# Patient Record
Sex: FEMALE | Race: WHITE
[De-identification: names, ages, dates, MRNs, and addresses within clinical notes are randomized per-mention and may not be internally consistent; named-entity substitution may affect disease eponyms.]

---

## 2017-12-04 ENCOUNTER — HOSPITAL ENCOUNTER (EMERGENCY)
Dept: HOSPITAL 25 - ED | Age: 42
Discharge: HOME | End: 2017-12-04
Payer: COMMERCIAL

## 2017-12-04 VITALS — SYSTOLIC BLOOD PRESSURE: 124 MMHG | DIASTOLIC BLOOD PRESSURE: 73 MMHG

## 2017-12-04 DIAGNOSIS — R42: ICD-10-CM

## 2017-12-04 DIAGNOSIS — F17.210: ICD-10-CM

## 2017-12-04 DIAGNOSIS — R07.9: Primary | ICD-10-CM

## 2017-12-04 LAB
ALBUMIN SERPL BCG-MCNC: 4.2 G/DL (ref 3.2–5.2)
ALP SERPL-CCNC: 45 U/L (ref 34–104)
ALT SERPL W P-5'-P-CCNC: 9 U/L (ref 7–52)
ANION GAP SERPL CALC-SCNC: 5 MMOL/L (ref 2–11)
AST SERPL-CCNC: 12 U/L (ref 13–39)
BUN SERPL-MCNC: 14 MG/DL (ref 6–24)
BUN/CREAT SERPL: 16.7 (ref 8–20)
CALCIUM SERPL-MCNC: 9 MG/DL (ref 8.6–10.3)
CHLORIDE SERPL-SCNC: 108 MMOL/L (ref 101–111)
CK SERPL-CCNC: 38 U/L (ref 10–223)
GLOBULIN SER CALC-MCNC: 2.8 G/DL (ref 2–4)
GLUCOSE SERPL-MCNC: 102 MG/DL (ref 70–100)
HCO3 SERPL-SCNC: 26 MMOL/L (ref 22–32)
HCT VFR BLD AUTO: 38 % (ref 35–47)
HGB BLD-MCNC: 13.5 G/DL (ref 12–16)
MCH RBC QN AUTO: 32 PG (ref 27–31)
MCHC RBC AUTO-ENTMCNC: 35 G/DL (ref 31–36)
MCV RBC AUTO: 90 FL (ref 80–97)
POTASSIUM SERPL-SCNC: 4 MMOL/L (ref 3.5–5)
PROT SERPL-MCNC: 7 G/DL (ref 6.4–8.9)
RBC # BLD AUTO: 4.25 10^6/UL (ref 4–5.4)
SODIUM SERPL-SCNC: 139 MMOL/L (ref 133–145)
T4 SERPL-MCNC: 6.36 MCG/ML (ref 6.09–12.23)
TROPONIN I SERPL-MCNC: 0 NG/ML (ref ?–0.04)
TSH SERPL-ACNC: 1.62 MCIU/ML (ref 0.34–5.6)
WBC # BLD AUTO: 5.9 10^3/UL (ref 3.5–10.8)

## 2017-12-04 PROCEDURE — 99283 EMERGENCY DEPT VISIT LOW MDM: CPT

## 2017-12-04 PROCEDURE — 36415 COLL VENOUS BLD VENIPUNCTURE: CPT

## 2017-12-04 PROCEDURE — 93005 ELECTROCARDIOGRAM TRACING: CPT

## 2017-12-04 PROCEDURE — 84443 ASSAY THYROID STIM HORMONE: CPT

## 2017-12-04 PROCEDURE — 82553 CREATINE MB FRACTION: CPT

## 2017-12-04 PROCEDURE — 85025 COMPLETE CBC W/AUTO DIFF WBC: CPT

## 2017-12-04 PROCEDURE — 84484 ASSAY OF TROPONIN QUANT: CPT

## 2017-12-04 PROCEDURE — 80053 COMPREHEN METABOLIC PANEL: CPT

## 2017-12-04 PROCEDURE — 84436 ASSAY OF TOTAL THYROXINE: CPT

## 2017-12-04 PROCEDURE — 82550 ASSAY OF CK (CPK): CPT

## 2017-12-04 PROCEDURE — 85730 THROMBOPLASTIN TIME PARTIAL: CPT

## 2017-12-04 PROCEDURE — 83880 ASSAY OF NATRIURETIC PEPTIDE: CPT

## 2017-12-04 PROCEDURE — 84702 CHORIONIC GONADOTROPIN TEST: CPT

## 2017-12-04 PROCEDURE — 71020: CPT

## 2017-12-04 PROCEDURE — 83605 ASSAY OF LACTIC ACID: CPT

## 2017-12-04 NOTE — RAD
INDICATION: Chest pain     



COMPARISON: None

 

TECHNIQUE: PA and lateral dual-energy views were obtained.



FINDINGS: 



Bones/Soft Tissues: There are no acute bony findings.    



Cardiomediastinal: The cardiomediastinal silhouette is normal. 



Lungs: There are no infiltrates.



Pleura: There are no pleural effusions. 



Other: None



IMPRESSION:  NEGATIVE EXAMINATION.

## 2017-12-05 NOTE — ED
Mireille ESPINOZA Edward, scribed for Kirit Casey MD on 12/04/17 at 1642 .





HPI Chest Pain





- HPI Summary


HPI Summary: 





40 y/o female presents to the ED c/o CP starting 5 days ago. The pain started 

at the R side of the neck that radiated down into the chest. It was described 

as a tightness. The pt had CP earlier today. Currently the pt denies CP, SOB, 

and N/V/D. The pt's BP, taken today by herself was 166/116. Pt went to her PCP. 

Associated sx: intermittent dizziness earlier today. PMHx lymphedema.





- History of Current Complaint


Chief Complaint: EDChestPainROMI


Time Seen by Provider: 12/04/17 16:41


Hx Obtained From: Patient


Hx Last Menstrual Period: 1 WEEK AGO


Onset/Duration: Started Days Ago, Still Present


Pain Intensity: 0


Chest Pain Location: Mid Sternal


Chest Pain Radiates: Yes


Chest Pain Radiates To:: Neck


Character: Tightness


Aggravating Factor(s): Nothing


Alleviating Factor(s): Nothing


Associated Signs and Symptoms: Positive: Chest Pain, Dizziness.  Negative: 

Shortness of Breath, Nausea, Vomiting, Other: - negative: diarrhea





- Allergy/Home Medications


Allergies/Adverse Reactions: 


 Allergies











Allergy/AdvReac Type Severity Reaction Status Date / Time


 


Cefadroxil [From Duricef] Allergy Severe N/V Verified 07/24/14 18:10














PMH/Surg Hx/FS Hx/Imm Hx


Previously Healthy: No


Endocrine/Hematology History: 


   Denies: Hx Diabetes, Hx Thyroid Disease


Cardiovascular History: 


   Denies: Hx Hypertension


Respiratory History: 


   Denies: Hx Asthma, Hx Chronic Obstructive Pulmonary Disease (COPD)


GI History: 


   Denies: Hx Ulcer





- Cancer History


Hx Chemotherapy: No


Hx Radiation Therapy: No


Infectious Disease History: No


Infectious Disease History: 


   Denies: Hx Clostridium Difficile, Hx Hepatitis, Hx Human Immunodeficiency 

Virus (HIV), Hx of Known/Suspected MRSA, Hx Shingles, Hx Tuberculosis, Traveled 

Outside the [place] in Last 30 Days





- Family History


Known Family History: Positive: None





- Social History


Alcohol Use: Occasionally


Hx Substance Use: No


Substance Use Type: Reports: None


Hx Tobacco Use: Yes


Smoking Status (MU): Heavy Every Day Tobacco Smoker


Type: Cigarettes


Amount Used/How Often: 1 PPD





Review of Systems


Constitutional: Negative


Eyes: Negative


ENT: Negative


Positive: Chest Pain


Respiratory: Negative


Gastrointestinal: Negative


Genitourinary: Negative


Musculoskeletal: Negative


Skin: Negative


Neurological: Other - dizziness


Psychological: Normal


All Other Systems Reviewed And Are Negative: Yes





Physical Exam





- Summary


Physical Exam Summary: 





VITAL SIGNS: Reviewed.


GENERAL: ~Patient is a well-developed and nourished female who is lying 

comfortable in the stretcher. ~Patient is not in any acute respiratory distress.


HEAD AND FACE: No signs of trauma. ~No ecchymosis, hematomas or skull 

depressions. No sinus tenderness.


EYES: PERRLA, EOMI x 2, No injected conjunctiva, no nystagmus.


EARS: Hearing grossly intact. Ear canals and tympanic membranes are within 

normal limits.


MOUTH: Oropharynx within normal limits.


NECK: Supple, trachea is midline, no adenopathy, no JVD, no carotid bruit, no c-

spine tenderness, neck with full ROM.


CHEST: Symmetric, no tenderness at palpation


LUNGS: Clear to auscultation bilaterally. No wheezing or crackles.


CVS: Regular rate and rhythm, S1 and S2 present, no murmurs or gallops 

appreciated.


ABDOMEN: Soft, non-tender. No signs of distention. No rebound no guarding, and 

no masses palpated. Bowel sounds are normal.


EXTREMITIES: FROM in all major joints, no edema, no cyanosis or clubbing.


NEURO: Alert and oriented x 3. No acute neurological deficits. Speech is normal 

and follows commands.


SKIN: Dry and warm


Triage Information Reviewed: Yes


Vital Signs On Initial Exam: 


 Initial Vitals











Temp Pulse Resp BP Pulse Ox


 


 97.1 F   80   16   156/100   99 


 


 12/04/17 16:33  12/04/17 16:33  12/04/17 16:33  12/04/17 16:33  12/04/17 16:33











Vital Signs Reviewed: Yes





Diagnostics





- Vital Signs


 Vital Signs











  Temp Pulse Resp BP Pulse Ox


 


 12/04/17 16:33  97.1 F  80  16  156/100  99














- Laboratory


Result Diagrams: 


 12/04/17 16:51





 12/04/17 16:51


Lab Statement: Any lab studies that have been ordered have been reviewed, and 

results considered in the medical decision making process.





- Radiology


  ** CXR


Xray Interpretation: No Acute Changes - NEGATIVE EXAM


Radiology Interpretation Completed By: Radiologist - ED PHYSICIAN REVIEWS AND 

AGREES





- EKG


  ** 1


EKG Interpretation: 16:59 - SR @ 68 BPM. NO ST ELEVATIONS





Chest Pain Course/Dx





- Course


Assessment/Plan: 40 y/o female presents to the ED c/o CP starting 5 days ago. 

The pain started at the R side of the neck that radiated down into the chest. 

It was described as a tightness. The pt had CP earlier today. Currently the pt 

denies CP, SOB, and N/V/D. The pt's BP, taken today by herself was 166/116. Pt 

went to her PCP. Associated sx: intermittent dizziness earlier today. PMHx 

lymphedema. EKG @ 16:59 - SR @ 68 BPM. NO ST ELEVATIONS. CXR NEGATIVE. Test 

without significant abnormalities. Trop 0.00. EKG shows no ST elevations. PT is 

asymptomatic in ED. I will wait on a second troponin; if negative the pt can be 

d/c with f/u with pcp. At this point, a 2nd trop will be done at 20:30. Pt will 

be sign out to Dr. Tobar to follow 2nd troponin.





- Diagnoses


Provider Diagnoses: 


 Chest pain








Discharge





- Discharge Plan


Condition: Stable


Disposition: OTHER


Discharge Disposition Comment: PT SIGNED OUT TO DR TOBAR PENDING 2nd TROP


Referrals: 


Maryan Whitfield MD [Primary Care Provider] - 





The documentation as recorded by the Mireille kearns Edward accurately reflects the 

service I personally performed and the decisions made by me, Kirit Casey MD.

## 2018-11-26 ENCOUNTER — HOSPITAL ENCOUNTER (EMERGENCY)
Dept: HOSPITAL 25 - UCEAST | Age: 43
Discharge: HOME | End: 2018-11-26
Payer: COMMERCIAL

## 2018-11-26 VITALS — DIASTOLIC BLOOD PRESSURE: 83 MMHG | SYSTOLIC BLOOD PRESSURE: 146 MMHG

## 2018-11-26 DIAGNOSIS — H57.12: ICD-10-CM

## 2018-11-26 DIAGNOSIS — H61.23: Primary | ICD-10-CM

## 2018-11-26 DIAGNOSIS — F17.210: ICD-10-CM

## 2018-11-26 DIAGNOSIS — H92.03: ICD-10-CM

## 2018-11-26 DIAGNOSIS — Z88.1: ICD-10-CM

## 2018-11-26 PROCEDURE — 99213 OFFICE O/P EST LOW 20 MIN: CPT

## 2018-11-26 PROCEDURE — G0463 HOSPITAL OUTPT CLINIC VISIT: HCPCS

## 2018-11-26 NOTE — XMS REPORT
Sushma Bartlett

 Created on:2018



Patient:Sushma Bartlett

Sex:Female

:1975

External Reference #:[id number]





Demographics







 Address  [facility] [place] [address]

 

 Mobile Phone  [phone]

 

 Preferred Language  English

 

 Marital Status  Not  Or 

 

 Yarsanism Affiliation  Unknown

 

 Race  White

 

 Ethnic Group  Not  Or 









Author







 Organization  [facility]

 

 Address  [address]



   [place] [address]

 

 Phone  [phone]









Support







 Name  Relationship  Address  Phone

 

 Haider Bartlett  Unavailable  Unavailable  [phone]









Care Team Providers







 Name  Role  Phone

 

 Maryan Whitfield MD  Primary Care Physician  Unavailable









Payers







 Type  Date  Identification Numbers  Payment Provider  Subscriber

 

 Commercial    Policy Number: [id number]  [facility]  Haider Bartlett









 PayID: [id number]  [address]









 [place] [address]







Problems







 Date  Description  Provider  Status

 

 Onset: 2016  Disturbance in sleep behavior  Carmella Gorman MD  Active

 

 Onset: 2016  Obesity  Carmella Gorman MD  Active

 

 Onset: 2016  Obstructive sleep apnea syndrome  Carmella Gorman MD  Active







Family History







 Date  Family Member(s)  Problem(s)  Comments

 

   General  Maternal Aunt  d/t  



     Pancreatic Ca  

 

   General  Paternal Aunt Pancreatic Ca  

 

   Father  Hypertension  

 

   Father   due to MI  ()

 

   Mother  Hyperlipidemia  

 

   Siblings  3  Brother w/HTN ; sister



       kidney stones; brother



       healthy







Social History







 Type  Date  Description  Comments

 

 Marital Status      

 

 Lives With    Family  

 

 Occupation    Currently Working  

 

 ETOH Use    Occasionally consumes alcohol  

 

 Recreational Drug Use    Denies Drug Use  

 

 Smoking    Patient is a former smoker  

 

 Smoking    Patient is a former smoker  quit May 2018

 

 Daily Caffeine    Consumes on average 1 cup of  



     regular coffee per day  

 

 Daily Caffeine    Consumes on average 1 soda per day  

 

 Exercise Type/Frequency    Exercises sporadically  

 

 General Hx [employer]







Allergies, Adverse Reactions, Alerts







 Date  Description  Reaction  Status  Severity  Comments

 

 2016  Duricef    active    Diarrhea,vomiting







Medications







 Medication  Date  Status  Form  Strength  Qnty  SIG  Indications  Ordering



                 Provider

 

 Pravastatin Sodium    Active  Tablets  40mg  30tab  take 1  E78.5          s  tablet    Thuman,



             oral daily    NP



             before    



             bedtime.    

 

 Ibuprofen    Active  Tablets  200mg    as needed    Unknown



   /2016              

 

 Tylenol Extra    Active  Tablets  500mg    as needed    Unknown



 Strength  /              

 

 Nicorette    Active  Gum  2mg    as needed    Unknown



   /              

 

                 

 

 Mandibular    Hx  Device      dear ,  G47.33  Carmella



 Advancement Device  /          please    Sherif,



   -          evaluate    MD



             and    



             fabricate    



             oral    



             appliance    



             for mild    



             sleep    



             apnea    

 

 Hydrochlorothiazide    Hx  Tablets  12.5mg    1 by mouth    Unknown



   /2016          every day    



   -              



                 







Vital Signs







 Date  Vital  Result  Comment

 

 2018  Weight  209.00 lb  with shoes









 Heart Rate  68 /min  

 

 BP Systolic Sitting  138 mmHg  Lue regular cuff

 

 BP Diastolic Sitting  82 mmHg  Lue regular cuff

 

 Ejection Fraction  50-55  echocardiogram 1/24/18









 10/02/2018  Height  62 inches  5'2"









 Weight  204.00 lb  

 

 Heart Rate  72 /min  

 

 BP Systolic  128 mmHg  Ra, reg

 

 BP Diastolic  94 mmHg  Ra, reg

 

 BMI (Body Mass Index)  37.3 kg/m2  

 

 Ejection Fraction  50%-55%  18  echo









 2018  Height  62 inches  5'2"









 Weight  190.75 lb  with shoes

 

 Heart Rate  72 /min  

 

 BP Systolic Sitting  128 mmHg  LA, reg cuff

 

 BP Diastolic Sitting  88 mmHg  LA, reg cuff

 

 BMI (Body Mass Index)  34.9 kg/m2  

 

 Ejection Fraction  50%-55%  echo 2018  Height  62 inches  5'2"









 Weight  191.00 lb  

 

 Heart Rate  76 /min  

 

 BP Systolic Sitting  122 mmHg  

 

 BP Diastolic Sitting  82 mmHg  

 

 Respiratory Rate  14 /min  

 

 O2 % BldC Oximetry  98 %  

 

 BMI (Body Mass Index)  34.9 kg/m2  









 2017  Height  62 inches  5'2"









 Weight  189.38 lb  with shoes

 

 Heart Rate  66 /min  

 

 BP Systolic Sitting  128 mmHg  LA reg cuff

 

 BP Diastolic Sitting  88 mmHg  LA reg cuff

 

 BMI (Body Mass Index)  34.6 kg/m2  









 2017  Height  62 inches  5'2"









 Weight  191.00 lb  

 

 Heart Rate  78 /min  

 

 BP Systolic Sitting  134 mmHg  

 

 BP Diastolic Sitting  96 mmHg  

 

 Respiratory Rate  16 /min  

 

 O2 % BldC Oximetry  98 %  room air

 

 BMI (Body Mass Index)  34.9 kg/m2  









 2017  Height  62 inches  5'2"









 Heart Rate  93 /min  

 

 BP Systolic Sitting  120 mmHg  

 

 BP Diastolic Sitting  70 mmHg  

 

 Respiratory Rate  16 /min  

 

 Pain Level  0  

 

 O2 % BldC Oximetry  98 %  









 10/27/2016  Height  62 inches  5'2"









 Weight  180.00 lb  

 

 Heart Rate  78 /min  

 

 BP Systolic Sitting  132 mmHg  

 

 BP Diastolic Sitting  70 mmHg  

 

 Respiratory Rate  16 /min  

 

 O2 % BldC Oximetry  97 %  

 

 BMI (Body Mass Index)  32.9 kg/m2  









 2016  Height  63 inches  5'3"









 Weight  183.00 lb  per pt

 

 Heart Rate  84 /min  

 

 BP Systolic Sitting  130 mmHg  

 

 BP Diastolic Sitting  66 mmHg  

 

 Respiratory Rate  16 /min  

 

 O2 % BldC Oximetry  98 %  

 

 BMI (Body Mass Index)  32.4 kg/m2  









 2016  Height  63 inches  5'3"









 Weight  190.00 lb  

 

 Heart Rate  85 /min  

 

 BP Systolic Sitting  122 mmHg  

 

 BP Diastolic Sitting  66 mmHg  

 

 Respiratory Rate  14 /min  

 

 O2 % BldC Oximetry  98 %  

 

 BMI (Body Mass Index)  33.7 kg/m2  









 2016  Height  63 inches  5'3"









 Weight  192.00 lb  

 

 Heart Rate  75 /min  

 

 BP Systolic Sitting  126 mmHg  

 

 BP Diastolic Sitting  74 mmHg  

 

 O2 % BldC Oximetry  8 %  

 

 BMI (Body Mass Index)  34.0 kg/m2  

 

 Neck Circumference in inches  14  







Results







 Test  Date  Test  Result  H/L  Range  Note

 

 Lipid Panel - JFM  2018  Creatine Kinase(CK)  40 U/L      1

 

 Comp Metabolic Panel  2018  Sodium  138 mmol/L    135-145  









 Chloride  109 mmol/L    101-111  

 

 Co2 Carbon Dioxide  22 mmol/L    22-32  

 

 Calcium  9.1 mg/dL    8.6-10.3  

 

 Albumin  4.0 g/dL    3.2-5.2  

 

 Total Bilirubin  0.50 mg/dL    0.2-1.0  

 

 Glucose  85 mg/dL      

 

 Blood Urea Nitrogen  13 mg/dL    6-24  

 

 Creatinine  0.74 mg/dL    0.51-0.95  

 

 BUN/Creatinine Ratio  17.6    8-20  

 

 Total Protein  6.7 g/dL    6.4-8.9  

 

 Globulin  2.7 g/dL    2-4  

 

 Albumin/Globulin Ratio  1.5    1-3  

 

 Alkaline Phosphatase  51 U/L      

 

 Alt  11 U/L    7-52  

 

 Egfr Non-  86.1    >60  

 

 Egfr   104.1    >60  2

 

 Potassium  TNP mmol/L    3.5-5.0  3

 

 Anion Gap  7 mmol/L    2-11  

 

 Ast  TNP U/L    13-39  4









 Lipid Profile (Trig/Chol/HDL)  2018  Triglycerides  71 mg/dL      5









 Cholesterol  237 mg/dL      6

 

 HDL Cholesterol  54.7 mg/dL      7

 

 LDL Cholesterol  168 mg/dL      8









 1  SHORT DRAW!!



   fasting



   before ov with NP

 

 2  *******Because ethnic data is not always readily available,



   this report includes an eGFR for both -Americans and



   non- Americans.****



   The National Kidney Disease Education Program (NKDEP) does



   not endorse the use of the MDRD equation for patients that



   are not between the ages of 18 and 70, are pregnant, have



   extremes of body size, muscle mass, or nutritional status,



   or are non- or non-.



   According to the National Kidney Foundation, irrespective of



   diagnosis, the stage of the disease is based on the level of



   kidney function:



   Stage Description                      GFR(mL/min/1.73 m(2))



   1     Kidney damage with normal or decreased GFR       90



   2     Kidney damage with mild decrease in GFR          60-89



   3     Moderate decrease in GFR                         30-59



   4     Severe decrease in GFR                           15-29



   5     Kidney failure                       <15 (or dialysis)

 

 3  Specimen Hemolyzed. Result may not be valid.



   Unable to report test result due to hemolysis.

 

 4  Unable to report test result due to hemolysis.

 

 5  Desirable: <150



   Borderline High: 150-199



   High: 200-499



   Very High: >500

 

 6  Desirable: <200



   Borderline High: 200-239



   High: >239

 

 7  Low: <40



   Desirable: 40-60



   High: >60

 

 8  Desirable: <100



   Near Optimal: 100-129



   Borderline High: 130-159



   High: 160-189



   Very High: >189







Procedures







 Date  CPT Code  Description  Status

 

 10/02/2018  38480  EKG Tracing & Interpretation  Completed

 

 2018  64505  ECHO Transthoracic, Real-Time 2D With Doppler And Color  
Completed



     Flow  

 

 2018  15225  ECHO Transthoracic, Real-Time 2D With Doppler And Color  
Completed



     Flow  

 

 2018  10472  ECHO Stress Test Incl Perf Contiuous ekg Monitoring  
Completed



     W/Phys Superv  

 

 2017  28833  EKG Tracing & Interpretation  Completed

 

 2016  15572  Sleep Study Unattended,HRT Rate,Oxygen Sat,Resp  Completed



     Effort/Airflow  







Encounters







 Type  Date  Location  Provider  CPT E/M  Dx

 

 Office Visit  10/02/2018  4:00p  [facility]  [place],  
[address]  E66.9



       M.D.    









 G47.33

 

 Z87.891

 

 Z82.49

 

 Z82.41

 

 R94.31

 

 Z68.37









 Office Visit  2018  2:00p  [facility] [address] [facility],  
[address]  R07.9



       M.D.    









 G47.33

 

 E66.9

 

 F17.210









 Office Visit  2018 10:30a  [facility]  Carmella Gorman MD  
[id number]  G47.33



     [facility]      









 F17.210









 Office Visit  2017  2:00p  [facility] [address] [facility],  
[address]  G47.33



       M.D.    









 R07.9

 

 E66.9

 

 F17.210

 

 R94.31









 Office Visit  2017 10:00a  [facility]  Carmella Gorman MD  
[id number]  G47.33



     [facility]      

 

 Office Visit  2017  3:30p  [facility]  Carmella Gorman MD  
[address]  G47.33



     [facility]      

 

 Office Visit  10/27/2016  2:30p  [facility]  Carmella Gorman MD  
[address]  G47.33



     [facility]      









 E66.09









 Office Visit  2016  3:15p  [facility]  Carmella Gorman MD  
[address]  G47.33



     [facility]      









 E66.09









 Office Visit  2016  3:15p  [facility] Stacey Gorman MD  
[address]  G47.33



     [facility]      









 E66.09









 Office Visit  2016  2:00p  [facility]  Carmella Gorman MD  
[address]  G47.9



     [facility]      









 E66.09







Plan of Care

2018 - Laurie De Guzman NPE78.5 Hyperlipidemia, unspecifiedNew Medication:
Pravastatin Sodium 40 mgFollow up:in 12 months with Z87.891 
Personal history of nicotine axptmhiqxmI72.41 Family history of sudden cardiac 
death

## 2018-11-26 NOTE — UC
Ear Complaint HPI





- HPI Summary


HPI Summary: 





42-year-old woman here clinic today with a chief complaint of bilateral ear 

pain.  Pain started about a week ago right worse than the left.  She feels like 

there is fluid In her years.  She has minimal rhinorrhea no sore throat no 

fevers or chills.  She also has some discomfort in the left eye is been going 

on for one week.  She feels like something is stuck underneath the upper eyelid 

on the lateral aspect.  No known trauma no known foreign body.  There is 

minimal eye discharge.  She does not wear contacts she is not wear glasses





- History of Current Complaint


Chief Complaint: UCEar


Stated Complaint: EAR PAIN, EYE PAIN


Time Seen by Provider: 11/26/18 11:47


Hx Last Menstrual Period: 1 WEEK AGO


Pain Intensity: 3





- Allergies/Home Medications


Allergies/Adverse Reactions: 


 Allergies











Allergy/AdvReac Type Severity Reaction Status Date / Time


 


cefadroxil [From Duricef] Allergy  Rash Verified 11/26/18 11:51


 


Sulfa (Sulfonamide Allergy  Rash Verified 11/26/18 11:51





Antibiotics)     














PMH/Surg Hx/FS Hx/Imm Hx


Respiratory History: Asthma





- Surgical History


Surgical History: None





- Family History


Known Family History: Positive: None





- Social History


Alcohol Use: Occasionally


Substance Use Type: None


Smoking Status (MU): Heavy Every Day Tobacco Smoker


Type: Cigarettes


Amount Used/How Often: 1 PPD





Review of Systems


All Other Systems Reviewed And Are Negative: Yes


Constitutional: Positive: Negative


Skin: Positive: Negative


Eyes: Positive: Drainage


ENT: Positive: Ear Ache


Respiratory: Positive: Negative


Cardiovascular: Positive: Negative


Gastrointestinal: Positive: Negative


Motor: Positive: Negative


Neurovascular: Positive: Negative


Musculoskeletal: Positive: Negative


Neurological: Positive: Negative


Psychological: Positive: Negative


Is Patient Immunocompromised?: No





Physical Exam


Triage Information Reviewed: Yes


Appearance: Well-Appearing, No Pain Distress, Well-Nourished


Vital Signs: 


 Initial Vital Signs











Temp  98.8 F   11/26/18 11:47


 


Pulse  66   11/26/18 11:47


 


Resp  16   11/26/18 11:47


 


BP  146/83   11/26/18 11:47


 


Pulse Ox  99   11/26/18 11:47











Vital Signs Reviewed: Yes


Eyes: Positive: Other: - Both eyes appear normal with PERRLA/EOMI. there is no 

scleral injection.  I don't see an obvious swelling of the eyelids.


ENT: Positive: Pharynx normal, Other - Bilateral cerumen impaction


Neck exam: Normal


Neck: Positive: Supple


Respiratory: Positive: Lungs clear, Normal breath sounds, No respiratory 

distress


Cardiovascular: Positive: RRR


Musculoskeletal Exam: Normal


Musculoskeletal: Positive: Strength Intact, ROM Intact


Neurological Exam: Normal


Neurological: Positive: Alert, Muscle Tone Normal


Psychological Exam: Normal


Psychological: Positive: Age Appropriate Behavior


Skin Exam: Normal





Ear Complaint Course/Dx





- Course


Course Of Treatment: After flushing of both ears there is less cerumen but is 

not completely clear.  We'll treat with both antibiotic eardrops and by mouth 

antibiotics for the ears.  On for seen stain of the left eye there on the lower 

lid is ingrown eyelash rubbing on the eye.  No corneal abrasion seen.  I 

removed the eyelash.  We'll treat with antibiotic drops for the eye.  Follow up 

with ophthalmology if not completely improved.





- Differential Dx/Diagnosis


Provider Diagnosis: 


 Impacted cerumen of both ears, Acute pain of both ears, Left eye pain








Discharge





- Sign-Out/Discharge


Documenting (check all that apply): Patient Departure


All imaging exams completed and their final reports reviewed: No Studies





- Discharge Plan


Condition: Stable


Disposition: HOME


Prescriptions: 


Amoxicillin/Clavulanate TAB* [Augmentin *] 875 mg PO BID #20 tab


Neomyc/Polym/HC 1% OTIC SUSP* [Cortisporin Otic Susp 1%*] 4 drop BOTH EARS QID #

1 btl


Tobramycin 0.3% OPHTH.SOL* 1 drop LEFT EYE Q4H #1 btl


Patient Education Materials:  Earache (ED), Cerumen Impaction (ED), Eye Pain (ED

)


Referrals: 


Maryan Whitfield MD [Primary Care Provider] - 


Francisco Neville MD [Medical Doctor] - 


Additional Instructions: 


FOLLOW UP WITH YOUR PRIMARY CARE DOCTOR FOR YOUR EARS AND OPHTHALMOLOGY FOR 

YOUR LEFT EYE  IF NOT COMPLETELY IMPROVED.


GET RECHECKED FOR ANY WORSENING OF YOUR CONDITION OR QUESTIONS OR CONCERNS.








- Billing Disposition and Condition


Condition: STABLE


Disposition: Home